# Patient Record
Sex: FEMALE
[De-identification: names, ages, dates, MRNs, and addresses within clinical notes are randomized per-mention and may not be internally consistent; named-entity substitution may affect disease eponyms.]

---

## 2020-07-11 ENCOUNTER — APPOINTMENT (OUTPATIENT)
Dept: ORTHOPEDIC SURGERY | Facility: CLINIC | Age: 50
End: 2020-07-11
Payer: COMMERCIAL

## 2020-07-11 VITALS
BODY MASS INDEX: 27.83 KG/M2 | SYSTOLIC BLOOD PRESSURE: 116 MMHG | DIASTOLIC BLOOD PRESSURE: 75 MMHG | HEART RATE: 88 BPM | HEIGHT: 64 IN | WEIGHT: 163 LBS

## 2020-07-11 VITALS — TEMPERATURE: 97.5 F

## 2020-07-11 DIAGNOSIS — Z85.118 PERSONAL HISTORY OF OTHER MALIGNANT NEOPLASM OF BRONCHUS AND LUNG: ICD-10-CM

## 2020-07-11 DIAGNOSIS — D49.2 NEOPLASM OF UNSPECIFIED BEHAVIOR OF BONE, SOFT TISSUE, AND SKIN: ICD-10-CM

## 2020-07-11 PROCEDURE — 72070 X-RAY EXAM THORAC SPINE 2VWS: CPT

## 2020-07-11 PROCEDURE — 99205 OFFICE O/P NEW HI 60 MIN: CPT

## 2020-07-11 NOTE — REVIEW OF SYSTEMS
[Joint Pain] : joint pain [Negative] : Heme/Lymph [Cough] : no cough [Fever] : no fever [Chills] : no chills [SOB on Exertion] : no shortness of breath on exertion

## 2020-07-11 NOTE — PHYSICAL EXAM
[Poor Appearance] : well-appearing [de-identified] : CONSTITUTIONAL: The patient is a very pleasant individual who is well-nourished and who appears stated age.\par PSYCHIATRIC: The patient is alert and oriented X 3 and in no apparent distress, and participates with orthopedic evaluation well.\par HEAD: Atraumatic and is nonsyndromic in appearance.\par EENT: No visible thyromegaly, EOMI.\par RESPIRATORY: Respiratory rate is regular, not dyspneic on examination.\par LYMPHATICS: There is no inguinal lymphadenopathy\par INTEGUMENTARY: Skin is clean, dry, and intact about the bilateral lower extremities and lumbar spine.\par VASCULAR: There is brisk capillary refill about the bilateral lower extremities.\par NEUROLOGIC: There are no pathologic reflexes. There is no decrease in sensation of the bilateral lower extremities on Wartenberg pinwheel examination. Deep tendon reflexes are well maintained at 2+/4 of the bilateral lower extremities and are symmetric.\par MUSCULOSKELETAL: There is no visible muscular atrophy. Manual motor strength is well maintained in the bilateral lower extremities. Range of motion of lumbar spine is well maintained. The patient ambulates in a non-myelopathic manner. Negative tension sign and straight leg raise bilaterally. Quad extension, ankle dorsiflexion, EHL, plantar flexion, and ankle eversion are well preserved. Normal secondary orthopaedic exam of bilateral hips, greater trochanteric area, knees and ankles \par \par non-tender thoracic spine. no radiculitis. benign physical exam. [Acute Distress] : not in acute distress [de-identified] : Xray of the Thoracic spine taken today shows thoracic kyphosis is maintained, no acute compression fractures depicted , no scoliosis, visualized pedicles show no deficiencies. \par \par CT report on her phone from [facility] stating there is a T1 vertebral lesion which is new since CT October 2019

## 2020-07-11 NOTE — ADDENDUM
[FreeTextEntry1] : Documented by Lucian Tobin acting as a scribe for KIT Mazariegos on 07/11/2020\par All medical record entries made by the Scribe were at my, KIT Mazariegos, direction and personally dictated by me on 07/11/2020 . I have reviewed the chart and agree that the record accurately reflects my personal performance of the history, physical exam, assessment and plan. I have also personally directed, reviewed, and agreed with the chart.

## 2020-07-11 NOTE — CONSULT LETTER
[Dear  ___] : Dear ~DULCE, [Please see my note below.] : Please see my note below. [Consult Letter:] : I had the pleasure of evaluating your patient, [unfilled]. [Sincerely,] : Sincerely, [FreeTextEntry3] : Daniel Fletcher, DO\par Diane Gamez PA

## 2020-07-11 NOTE — HISTORY OF PRESENT ILLNESS
[de-identified] : 50 year old F presents in office today after she was referred here from PCP. She has a CT report on her phone from [facility] stating there is a T1 vertebral lesion which is new since CT October 2019. Hx of lung CA. [Incontinence] : no incontinence [Ataxia] : no ataxia [Loss of Dexterity] : good dexterity [Urinary Ret.] : no urinary retention

## 2020-07-11 NOTE — DISCUSSION/SUMMARY
[de-identified] : A thoracic MRI has been ordered and is medically necessary due to persistent pain, delineate new T1 lesion, new since October 2019. Radiologist recommended to get MRI. Pt has hx of lung CA, and this is to r/o metastatic lesion. MRI will help guide treatment plan, possible surgical intervention/biopsy. The patient will follow up after the MRI results have been obtained and will bring physical copy of CT scan of thoracic spine.  If interval follow up MRI/CT studies are needed in the future, they can be ordered by PCP or oncology and Orthopedic Spine Surgeon can be consulted if and when surgical intervention is needed, ie if biopsy or surgical intervention is necessary.  I recommend going back to her [facility] to orchestrate studies/care which she has not seen in 1 year but she declines at this time. \par \par Complex due to hx of lung CA.\par Patient with multiple medical comorbidity increasing the risk of perioperative and postoperative complications as well as diminished spine outcomes as per the current medical literature.  These include but are not limited to obesity, anxiety/depression, cardiac illness, kidney disease, peripheral vascular disease, history of cancer, COPD, dysmetabolic syndrome including but not limited to diabetes, hyperlipidemia, hypertension.  Patient is being referred back to primary care provider for medical optimization, as well as other appropriate specialists as needed for optimization prior to spine surgery.

## 2020-07-14 DIAGNOSIS — Z78.9 OTHER SPECIFIED HEALTH STATUS: ICD-10-CM

## 2020-07-14 RX ORDER — NYSTATIN AND TRIAMCINOLONE ACETONIDE 100000; 1 MG/G; MG/G
100000-0.1 CREAM TOPICAL
Qty: 15 | Refills: 0 | Status: ACTIVE | COMMUNITY
Start: 2020-06-11

## 2020-07-14 RX ORDER — SODIUM PICOSULFATE, MAGNESIUM OXIDE, AND ANHYDROUS CITRIC ACID 10; 3.5; 12 MG/160ML; G/160ML; G/160ML
10-3.5-12 MG-GM LIQUID ORAL
Qty: 320 | Refills: 0 | Status: ACTIVE | COMMUNITY
Start: 2020-06-17

## 2021-12-20 ENCOUNTER — RESULT REVIEW (OUTPATIENT)
Age: 51
End: 2021-12-20